# Patient Record
Sex: FEMALE | Race: BLACK OR AFRICAN AMERICAN | Employment: FULL TIME | URBAN - METROPOLITAN AREA
[De-identification: names, ages, dates, MRNs, and addresses within clinical notes are randomized per-mention and may not be internally consistent; named-entity substitution may affect disease eponyms.]

---

## 2023-06-27 ENCOUNTER — CLINICAL DOCUMENTATION (OUTPATIENT)
Age: 58
End: 2023-06-27

## 2023-09-29 PROBLEM — R26.2 DIFFICULTY IN WALKING: Status: ACTIVE | Noted: 2019-01-31

## 2023-09-29 PROBLEM — R11.11 VOMITING WITHOUT NAUSEA: Status: ACTIVE | Noted: 2022-11-29

## 2023-09-29 PROBLEM — M25.571 ACUTE RIGHT ANKLE PAIN: Status: ACTIVE | Noted: 2019-01-31

## 2023-09-29 PROBLEM — R74.8 ELEVATED ALKALINE PHOSPHATASE LEVEL: Status: ACTIVE | Noted: 2021-03-30

## 2023-09-29 PROBLEM — H04.123 INSUFFICIENCY OF TEAR FILM OF BOTH EYES: Status: ACTIVE | Noted: 2018-05-03

## 2023-09-29 PROBLEM — H25.813 COMBINED FORMS OF AGE-RELATED CATARACT OF BOTH EYES: Status: ACTIVE | Noted: 2018-05-03

## 2023-09-29 RX ORDER — ESOMEPRAZOLE MAGNESIUM 40 MG/1
40 CAPSULE, DELAYED RELEASE ORAL DAILY
COMMUNITY
Start: 2012-05-04

## 2023-09-29 RX ORDER — MONTELUKAST SODIUM 10 MG/1
10 TABLET ORAL DAILY
COMMUNITY
Start: 2017-03-02

## 2023-09-29 RX ORDER — IBUPROFEN 600 MG/1
600 TABLET ORAL EVERY 6 HOURS PRN
COMMUNITY
Start: 2012-08-02

## 2023-09-29 RX ORDER — FAMOTIDINE 40 MG/1
40 TABLET, FILM COATED ORAL NIGHTLY
Qty: 30 TABLET | Refills: 11 | COMMUNITY
Start: 2022-12-15 | End: 2023-12-15

## 2023-09-29 RX ORDER — SUMATRIPTAN 20 MG/1
1 SPRAY NASAL
COMMUNITY
Start: 2017-03-02

## 2023-09-29 RX ORDER — MELATONIN
1000 DAILY
COMMUNITY

## 2023-09-29 RX ORDER — ALBUTEROL SULFATE 90 UG/1
2 AEROSOL, METERED RESPIRATORY (INHALATION) PRN
COMMUNITY
Start: 2016-02-29

## 2023-09-29 RX ORDER — HYDROCODONE BITARTRATE AND ACETAMINOPHEN 7.5; 325 MG/1; MG/1
7.5 TABLET ORAL PRN
COMMUNITY
Start: 2017-04-10 | End: 2023-10-02

## 2023-09-29 RX ORDER — ALPRAZOLAM 0.25 MG/1
0.25 TABLET ORAL 3 TIMES DAILY PRN
COMMUNITY
Start: 2023-07-03 | End: 2023-10-02

## 2023-09-29 RX ORDER — LANOLIN ALCOHOL/MO/W.PET/CERES
325 CREAM (GRAM) TOPICAL DAILY
COMMUNITY
Start: 2012-05-04

## 2023-09-29 RX ORDER — FLUTICASONE PROPIONATE AND SALMETEROL 250; 50 UG/1; UG/1
1 POWDER RESPIRATORY (INHALATION) 2 TIMES DAILY
COMMUNITY
Start: 2023-02-28

## 2023-09-29 RX ORDER — HERBAL DRUGS
CAPSULE ORAL
COMMUNITY

## 2023-09-29 RX ORDER — FLUTICASONE PROPIONATE 50 MCG
SPRAY, SUSPENSION (ML) NASAL
COMMUNITY
Start: 2022-11-26

## 2023-09-29 RX ORDER — ORPHENADRINE CITRATE 100 MG/1
100 TABLET, EXTENDED RELEASE ORAL 2 TIMES DAILY
COMMUNITY
Start: 2023-04-16 | End: 2023-10-02

## 2023-10-02 ENCOUNTER — HOSPITAL ENCOUNTER (OUTPATIENT)
Facility: HOSPITAL | Age: 58
Setting detail: SPECIMEN
Discharge: HOME | End: 2023-10-05
Payer: COMMERCIAL

## 2023-10-02 ENCOUNTER — OFFICE VISIT (OUTPATIENT)
Age: 58
End: 2023-10-02
Payer: COMMERCIAL

## 2023-10-02 VITALS
OXYGEN SATURATION: 98 % | WEIGHT: 163 LBS | TEMPERATURE: 97.8 F | SYSTOLIC BLOOD PRESSURE: 137 MMHG | DIASTOLIC BLOOD PRESSURE: 91 MMHG | BODY MASS INDEX: 34.22 KG/M2 | HEIGHT: 58 IN | HEART RATE: 83 BPM

## 2023-10-02 DIAGNOSIS — R74.8 ELEVATED ALKALINE PHOSPHATASE LEVEL: Primary | ICD-10-CM

## 2023-10-02 DIAGNOSIS — R74.8 ELEVATED ALKALINE PHOSPHATASE LEVEL: ICD-10-CM

## 2023-10-02 LAB
ALBUMIN SERPL-MCNC: 3.8 G/DL (ref 3.4–5)
ALBUMIN/GLOB SERPL: 1.1 (ref 0.8–1.7)
ALP SERPL-CCNC: 182 U/L (ref 45–117)
ALT SERPL-CCNC: 37 U/L (ref 13–56)
ANION GAP SERPL CALC-SCNC: 4 MMOL/L (ref 3–18)
AST SERPL-CCNC: 24 U/L (ref 10–38)
BASOPHILS # BLD: 0 K/UL (ref 0–0.1)
BASOPHILS NFR BLD: 1 % (ref 0–2)
BILIRUB DIRECT SERPL-MCNC: <0.1 MG/DL (ref 0–0.2)
BILIRUB SERPL-MCNC: 0.4 MG/DL (ref 0.2–1)
BUN SERPL-MCNC: 17 MG/DL (ref 7–18)
BUN/CREAT SERPL: 19 (ref 12–20)
CALCIUM SERPL-MCNC: 9.2 MG/DL (ref 8.5–10.1)
CHLORIDE SERPL-SCNC: 109 MMOL/L (ref 100–111)
CO2 SERPL-SCNC: 27 MMOL/L (ref 21–32)
CREAT SERPL-MCNC: 0.88 MG/DL (ref 0.6–1.3)
DIFFERENTIAL METHOD BLD: ABNORMAL
EOSINOPHIL # BLD: 0.1 K/UL (ref 0–0.4)
EOSINOPHIL NFR BLD: 1 % (ref 0–5)
ERYTHROCYTE [DISTWIDTH] IN BLOOD BY AUTOMATED COUNT: 12 % (ref 11.6–14.5)
FERRITIN SERPL-MCNC: 386 NG/ML (ref 8–388)
GLOBULIN SER CALC-MCNC: 3.4 G/DL (ref 2–4)
GLUCOSE SERPL-MCNC: 100 MG/DL (ref 74–99)
HCT VFR BLD AUTO: 38.3 % (ref 35–45)
HGB BLD-MCNC: 12.2 G/DL (ref 12–16)
IMM GRANULOCYTES # BLD AUTO: 0.1 K/UL (ref 0–0.04)
IMM GRANULOCYTES NFR BLD AUTO: 1 % (ref 0–0.5)
IRON SATN MFR SERPL: 21 % (ref 20–50)
IRON SERPL-MCNC: 66 UG/DL (ref 50–175)
LYMPHOCYTES # BLD: 2.4 K/UL (ref 0.9–3.6)
LYMPHOCYTES NFR BLD: 37 % (ref 21–52)
MCH RBC QN AUTO: 29.9 PG (ref 24–34)
MCHC RBC AUTO-ENTMCNC: 31.9 G/DL (ref 31–37)
MCV RBC AUTO: 93.9 FL (ref 78–100)
MONOCYTES # BLD: 0.5 K/UL (ref 0.05–1.2)
MONOCYTES NFR BLD: 8 % (ref 3–10)
NEUTS SEG # BLD: 3.5 K/UL (ref 1.8–8)
NEUTS SEG NFR BLD: 53 % (ref 40–73)
NRBC # BLD: 0 K/UL (ref 0–0.01)
NRBC BLD-RTO: 0 PER 100 WBC
PLATELET # BLD AUTO: 233 K/UL (ref 135–420)
PMV BLD AUTO: 12.5 FL (ref 9.2–11.8)
POTASSIUM SERPL-SCNC: 4.1 MMOL/L (ref 3.5–5.5)
PROT SERPL-MCNC: 7.2 G/DL (ref 6.4–8.2)
RBC # BLD AUTO: 4.08 M/UL (ref 4.2–5.3)
SODIUM SERPL-SCNC: 140 MMOL/L (ref 136–145)
TIBC SERPL-MCNC: 316 UG/DL (ref 250–450)
WBC # BLD AUTO: 6.5 K/UL (ref 4.6–13.2)

## 2023-10-02 PROCEDURE — 82390 ASSAY OF CERULOPLASMIN: CPT

## 2023-10-02 PROCEDURE — 86038 ANTINUCLEAR ANTIBODIES: CPT

## 2023-10-02 PROCEDURE — 36415 COLL VENOUS BLD VENIPUNCTURE: CPT

## 2023-10-02 PROCEDURE — 82728 ASSAY OF FERRITIN: CPT

## 2023-10-02 PROCEDURE — 80048 BASIC METABOLIC PNL TOTAL CA: CPT

## 2023-10-02 PROCEDURE — 86037 ANCA TITER EACH ANTIBODY: CPT

## 2023-10-02 PROCEDURE — 83540 ASSAY OF IRON: CPT

## 2023-10-02 PROCEDURE — 80076 HEPATIC FUNCTION PANEL: CPT

## 2023-10-02 PROCEDURE — 82103 ALPHA-1-ANTITRYPSIN TOTAL: CPT

## 2023-10-02 PROCEDURE — 99203 OFFICE O/P NEW LOW 30 MIN: CPT | Performed by: INTERNAL MEDICINE

## 2023-10-02 PROCEDURE — 86015 ACTIN ANTIBODY EACH: CPT

## 2023-10-02 PROCEDURE — 86381 MITOCHONDRIAL ANTIBODY EACH: CPT

## 2023-10-02 PROCEDURE — 85025 COMPLETE CBC W/AUTO DIFF WBC: CPT

## 2023-10-02 PROCEDURE — 83550 IRON BINDING TEST: CPT

## 2023-10-02 ASSESSMENT — PATIENT HEALTH QUESTIONNAIRE - PHQ9
SUM OF ALL RESPONSES TO PHQ QUESTIONS 1-9: 0
2. FEELING DOWN, DEPRESSED OR HOPELESS: 0
SUM OF ALL RESPONSES TO PHQ9 QUESTIONS 1 & 2: 0
1. LITTLE INTEREST OR PLEASURE IN DOING THINGS: 0
SUM OF ALL RESPONSES TO PHQ QUESTIONS 1-9: 0

## 2023-10-02 NOTE — PROGRESS NOTES
[address] [place] [facility], [address] [id number] [facility],ws

## 2023-10-03 LAB
A1AT SERPL-MCNC: 116 MG/DL (ref 101–187)
ANA TITR SER IF: NEGATIVE
CERULOPLASMIN SERPL-MCNC: 27.8 MG/DL (ref 19–39)
LABORATORY COMMENT REPORT: NORMAL

## 2023-10-04 LAB
MITOCHONDRIA M2 IGG SER-ACNC: <20 UNITS (ref 0–20)
SMA IGG SER-ACNC: 13 UNITS (ref 0–19)

## 2023-10-05 LAB
C-ANCA TITR SER IF: NORMAL TITER
P-ANCA ATYPICAL TITR SER IF: NORMAL TITER
P-ANCA TITR SER IF: NORMAL TITER

## 2023-10-09 ENCOUNTER — HOSPITAL ENCOUNTER (OUTPATIENT)
Facility: HOSPITAL | Age: 58
Discharge: HOME | End: 2023-10-12
Payer: COMMERCIAL

## 2023-10-09 DIAGNOSIS — R74.8 ELEVATED ALKALINE PHOSPHATASE LEVEL: ICD-10-CM

## 2023-10-09 PROCEDURE — A9579 GAD-BASE MR CONTRAST NOS,1ML: HCPCS | Performed by: NURSE PRACTITIONER

## 2023-10-09 PROCEDURE — 6360000004 HC RX CONTRAST MEDICATION: Performed by: NURSE PRACTITIONER

## 2023-10-09 PROCEDURE — 74183 MRI ABD W/O CNTR FLWD CNTR: CPT

## 2023-10-09 RX ADMIN — GADOTERIDOL 15 ML: 279.3 INJECTION, SOLUTION INTRAVENOUS at 16:33

## 2023-10-22 PROBLEM — R11.11 VOMITING WITHOUT NAUSEA: Status: RESOLVED | Noted: 2022-11-29 | Resolved: 2023-10-22

## 2023-10-22 PROBLEM — M25.571 ACUTE RIGHT ANKLE PAIN: Status: RESOLVED | Noted: 2019-01-31 | Resolved: 2023-10-22

## 2023-10-22 PROBLEM — H26.9 CATARACTS, BILATERAL: Status: ACTIVE | Noted: 2018-05-03

## 2023-10-22 PROBLEM — R26.2 DIFFICULTY IN WALKING: Status: RESOLVED | Noted: 2019-01-31 | Resolved: 2023-10-22

## 2023-10-22 PROBLEM — H04.123 INSUFFICIENCY OF TEAR FILM OF BOTH EYES: Status: RESOLVED | Noted: 2018-05-03 | Resolved: 2023-10-22

## 2023-11-07 ENCOUNTER — HOSPITAL ENCOUNTER (OUTPATIENT)
Facility: HOSPITAL | Age: 58
Setting detail: SPECIMEN
Discharge: HOME | End: 2023-11-10
Payer: COMMERCIAL

## 2023-11-07 ENCOUNTER — OFFICE VISIT (OUTPATIENT)
Age: 58
End: 2023-11-07
Payer: COMMERCIAL

## 2023-11-07 VITALS
WEIGHT: 162 LBS | BODY MASS INDEX: 34 KG/M2 | DIASTOLIC BLOOD PRESSURE: 85 MMHG | HEART RATE: 90 BPM | SYSTOLIC BLOOD PRESSURE: 136 MMHG | OXYGEN SATURATION: 98 % | HEIGHT: 58 IN | TEMPERATURE: 97.5 F

## 2023-11-07 DIAGNOSIS — R60.9 EDEMA, UNSPECIFIED TYPE: ICD-10-CM

## 2023-11-07 DIAGNOSIS — R74.8 ELEVATED ALKALINE PHOSPHATASE LEVEL: Primary | ICD-10-CM

## 2023-11-07 DIAGNOSIS — R74.8 ELEVATED ALKALINE PHOSPHATASE LEVEL: ICD-10-CM

## 2023-11-07 LAB
ALBUMIN SERPL-MCNC: 3.6 G/DL (ref 3.4–5)
ALBUMIN/GLOB SERPL: 1 (ref 0.8–1.7)
ALP SERPL-CCNC: 165 U/L (ref 45–117)
ALT SERPL-CCNC: 31 U/L (ref 13–56)
ANION GAP SERPL CALC-SCNC: 6 MMOL/L (ref 3–18)
AST SERPL-CCNC: 18 U/L (ref 10–38)
BASOPHILS # BLD: 0 K/UL (ref 0–0.1)
BASOPHILS NFR BLD: 1 % (ref 0–2)
BILIRUB DIRECT SERPL-MCNC: <0.1 MG/DL (ref 0–0.2)
BILIRUB SERPL-MCNC: 0.3 MG/DL (ref 0.2–1)
BUN SERPL-MCNC: 14 MG/DL (ref 7–18)
BUN/CREAT SERPL: 20 (ref 12–20)
CALCIUM SERPL-MCNC: 8.9 MG/DL (ref 8.5–10.1)
CHLORIDE SERPL-SCNC: 110 MMOL/L (ref 100–111)
CO2 SERPL-SCNC: 27 MMOL/L (ref 21–32)
CREAT SERPL-MCNC: 0.7 MG/DL (ref 0.6–1.3)
DIFFERENTIAL METHOD BLD: ABNORMAL
EOSINOPHIL # BLD: 0.1 K/UL (ref 0–0.4)
EOSINOPHIL NFR BLD: 2 % (ref 0–5)
ERYTHROCYTE [DISTWIDTH] IN BLOOD BY AUTOMATED COUNT: 12.1 % (ref 11.6–14.5)
GLOBULIN SER CALC-MCNC: 3.5 G/DL (ref 2–4)
GLUCOSE SERPL-MCNC: 137 MG/DL (ref 74–99)
HCT VFR BLD AUTO: 38 % (ref 35–45)
HGB BLD-MCNC: 12 G/DL (ref 12–16)
IMM GRANULOCYTES # BLD AUTO: 0 K/UL (ref 0–0.04)
IMM GRANULOCYTES NFR BLD AUTO: 1 % (ref 0–0.5)
LYMPHOCYTES # BLD: 2.5 K/UL (ref 0.9–3.6)
LYMPHOCYTES NFR BLD: 42 % (ref 21–52)
MCH RBC QN AUTO: 29.6 PG (ref 24–34)
MCHC RBC AUTO-ENTMCNC: 31.6 G/DL (ref 31–37)
MCV RBC AUTO: 93.6 FL (ref 78–100)
MONOCYTES # BLD: 0.4 K/UL (ref 0.05–1.2)
MONOCYTES NFR BLD: 7 % (ref 3–10)
NEUTS SEG # BLD: 2.9 K/UL (ref 1.8–8)
NEUTS SEG NFR BLD: 49 % (ref 40–73)
NRBC # BLD: 0 K/UL (ref 0–0.01)
NRBC BLD-RTO: 0 PER 100 WBC
PLATELET # BLD AUTO: 228 K/UL (ref 135–420)
PMV BLD AUTO: 12.4 FL (ref 9.2–11.8)
POTASSIUM SERPL-SCNC: 3.9 MMOL/L (ref 3.5–5.5)
PROT SERPL-MCNC: 7.1 G/DL (ref 6.4–8.2)
RBC # BLD AUTO: 4.06 M/UL (ref 4.2–5.3)
SODIUM SERPL-SCNC: 143 MMOL/L (ref 136–145)
WBC # BLD AUTO: 6 K/UL (ref 4.6–13.2)

## 2023-11-07 PROCEDURE — 83883 ASSAY NEPHELOMETRY NOT SPEC: CPT

## 2023-11-07 PROCEDURE — 82465 ASSAY BLD/SERUM CHOLESTEROL: CPT

## 2023-11-07 PROCEDURE — 83010 ASSAY OF HAPTOGLOBIN QUANT: CPT

## 2023-11-07 PROCEDURE — 82164 ANGIOTENSIN I ENZYME TEST: CPT

## 2023-11-07 PROCEDURE — 84478 ASSAY OF TRIGLYCERIDES: CPT

## 2023-11-07 PROCEDURE — 99213 OFFICE O/P EST LOW 20 MIN: CPT | Performed by: NURSE PRACTITIONER

## 2023-11-07 PROCEDURE — 91200 LIVER ELASTOGRAPHY: CPT | Performed by: NURSE PRACTITIONER

## 2023-11-07 PROCEDURE — 80048 BASIC METABOLIC PNL TOTAL CA: CPT

## 2023-11-07 PROCEDURE — 82977 ASSAY OF GGT: CPT

## 2023-11-07 PROCEDURE — 85025 COMPLETE CBC W/AUTO DIFF WBC: CPT

## 2023-11-07 PROCEDURE — 84450 TRANSFERASE (AST) (SGOT): CPT

## 2023-11-07 PROCEDURE — 36415 COLL VENOUS BLD VENIPUNCTURE: CPT

## 2023-11-07 PROCEDURE — 84460 ALANINE AMINO (ALT) (SGPT): CPT

## 2023-11-07 PROCEDURE — 80076 HEPATIC FUNCTION PANEL: CPT

## 2023-11-07 PROCEDURE — 82172 ASSAY OF APOLIPOPROTEIN: CPT

## 2023-11-07 NOTE — PROGRESS NOTES
MD Graham, FACP, Elizabeth Barker, [place]      STEPH Cristobal, [facility]   Eddie Shaw, [facility]   Klever Roberto, FNKAYY-MOUNIKA Sarkar, FNP-C   Rebekah Chase, [facility]   Ananya Wallace, [facility]      [address]   at [facility]   [address] [facility], [address] [facility], [address] [facility]   [phone]   FAX: [address]   at [facility], [address] [facility], [address]   [phone]   FAX: [phone]     Patient Care Team:  Jennifer Calderon MD as PCP - General    Patient Active Problem List   Diagnosis    Chronic cough    Cataracts, bilateral    Elevated alkaline phosphatase level    Gastroesophageal reflux disease     Mona Ocampo is being seen at [address] [place] [address],[address] [facility] for management of   elevated alkaline phosphatase. The active problem list, all pertinent past medical history, medications, liver histology, endoscopic studies, radiologic findings   and laboratory findings related to the liver disorder were reviewed and discussed with the patient. This is the first appointment at [address] [place] [address],[address] since 11/2014. The patient is a 59 y.o. female who was found to have elevated alkaline phosphatase in 2014. Serologic evaluation for markers of chronic liver disease was negative. The most recent imaging of the liver was ultrasound performed in 5/2023. Results suggest steatotic liver disease (SLD). No liver mass lesions noted. Assessment of liver fibrosis with Fibroscan was performed in the office today. The result was 9.5 kPa which correlates with stage 3 fibrosis. The CAP score of 287 suggests hepatic steatosis. In the office today the patient has the following symptoms:  The patient feels well.     The patient is not experiencing the following

## 2023-11-09 LAB — ACE SERPL-CCNC: 73 U/L (ref 14–82)

## 2023-11-10 LAB
A2 MACROGLOB SERPL-MCNC: 256 MG/DL (ref 110–276)
ALT SERPL-CCNC: 26 IU/L (ref 0–40)
APO A-I SERPL-MCNC: 184 MG/DL (ref 116–209)
AST SERPL W P-5'-P-CCNC: 23 IU/L (ref 0–40)
BILIRUB SERPL-MCNC: 0.2 MG/DL (ref 0–1.2)
CHOLEST SERPL-MCNC: 212 MG/DL (ref 100–199)
FIBROSIS SCORING:: ABNORMAL
FIBROSIS STAGE SERPL QL: ABNORMAL
GGT SERPL-CCNC: 72 IU/L (ref 0–60)
GLUCOSE SERPL-MCNC: 138 MG/DL (ref 70–99)
HAPTOGLOB SERPL-MCNC: 108 MG/DL (ref 33–346)
INTERPRETATION: ABNORMAL
LABORATORY COMMENT REPORT: ABNORMAL
LIVER FIBR SCORE SERPL CALC.FIBROSURE: 0.19 (ref 0–0.21)
Lab: ABNORMAL
NASH - STEATOSIS GRADE: ABNORMAL
NASH - STEATOSIS GRADING: ABNORMAL
NASH - STEATOSIS SCORE: 0.55 (ref 0–0.4)
NASH SCORING: ABNORMAL
NECROINFLAMMATORY ACT GRADE SERPL QL: ABNORMAL
NECROINFLAMMATORY ACT SCORE SERPL: 0.37 (ref 0–0.25)
SERVICE CMNT-IMP: ABNORMAL
TRIGL SERPL-MCNC: 80 MG/DL (ref 0–149)

## 2024-02-19 ENCOUNTER — HOSPITAL ENCOUNTER (OUTPATIENT)
Facility: HOSPITAL | Age: 59
Setting detail: SPECIMEN
Discharge: HOME | End: 2024-02-22
Payer: COMMERCIAL

## 2024-02-19 ENCOUNTER — OFFICE VISIT (OUTPATIENT)
Age: 59
End: 2024-02-19
Payer: COMMERCIAL

## 2024-02-19 VITALS
SYSTOLIC BLOOD PRESSURE: 128 MMHG | TEMPERATURE: 97.3 F | BODY MASS INDEX: 32.95 KG/M2 | OXYGEN SATURATION: 98 % | DIASTOLIC BLOOD PRESSURE: 71 MMHG | HEART RATE: 74 BPM | WEIGHT: 157 LBS | HEIGHT: 58 IN

## 2024-02-19 DIAGNOSIS — R74.8 ELEVATED ALKALINE PHOSPHATASE LEVEL: ICD-10-CM

## 2024-02-19 DIAGNOSIS — R74.8 ELEVATED ALKALINE PHOSPHATASE LEVEL: Primary | ICD-10-CM

## 2024-02-19 LAB
ALBUMIN SERPL-MCNC: 3.8 G/DL (ref 3.4–5)
ALBUMIN/GLOB SERPL: 1.2 (ref 0.8–1.7)
ALP SERPL-CCNC: 127 U/L (ref 45–117)
ALT SERPL-CCNC: 26 U/L (ref 13–56)
AST SERPL-CCNC: 20 U/L (ref 10–38)
BILIRUB DIRECT SERPL-MCNC: 0.1 MG/DL (ref 0–0.2)
BILIRUB SERPL-MCNC: 0.4 MG/DL (ref 0.2–1)
GLOBULIN SER CALC-MCNC: 3.1 G/DL (ref 2–4)
PROT SERPL-MCNC: 6.9 G/DL (ref 6.4–8.2)

## 2024-02-19 PROCEDURE — 36415 COLL VENOUS BLD VENIPUNCTURE: CPT

## 2024-02-19 PROCEDURE — 80076 HEPATIC FUNCTION PANEL: CPT

## 2024-02-19 PROCEDURE — 99213 OFFICE O/P EST LOW 20 MIN: CPT | Performed by: INTERNAL MEDICINE

## 2024-02-19 RX ORDER — DEXTROMETHORPHAN HYDROBROMIDE AND PROMETHAZINE HYDROCHLORIDE 15; 6.25 MG/5ML; MG/5ML
SYRUP ORAL
COMMUNITY
Start: 2024-01-31

## 2024-02-19 RX ORDER — PREDNISONE 20 MG/1
TABLET ORAL
COMMUNITY
Start: 2024-01-19

## 2024-02-19 RX ORDER — OSELTAMIVIR PHOSPHATE 75 MG/1
CAPSULE ORAL
COMMUNITY
Start: 2024-01-31

## 2024-02-19 RX ORDER — MELOXICAM 7.5 MG/1
7.5 TABLET ORAL DAILY
COMMUNITY
Start: 2024-02-13

## 2024-02-19 NOTE — PROGRESS NOTES
grandson.    The patient has never used tobacco products.    The patient consumes alcohol on social occasions never in excess.    The patient currently works full time as administrative work for .        PHYSICAL EXAMINATION:  /71 (Site: Right Upper Arm, Position: Sitting, Cuff Size: Medium Adult)   Pulse 74   Temp 97.3 °F (36.3 °C)   Ht 1.473 m (4' 10\")   Wt 71.2 kg (157 lb)   SpO2 98%   BMI 32.81 kg/m²     General: No acute distress.   Eyes: Sclera anicteric.   ENT: No oral lesions.  Thyroid normal.  Nodes: No adenopathy.   Skin: No spider angiomata.  No jaundice.  No palmar erythema.  Respiratory: Lungs clear to auscultation.   Cardiovascular: Regular heart rate.  No murmurs.  No JVD.  Abdomen: Soft non-tender.  Liver size normal to percussion/palpation.  Spleen not palpable. No obvious ascites.  Extremities: No edema.  No muscle wasting.  No gross arthritic changes.  Neurologic: Alert and oriented.  Cranial nerves grossly intact.  No asterixis.    LABORATORY STUDIES:   Latest Ref Rng 10/2/2023 11/7/2023 2/19/2024   [facility] - Routine Labs       WBC 4.6 - 13.2 K/uL 6.5  6.0     ANC 1.8 - 8.0 K/UL 3.5  2.9     HGB 12.0 - 16.0 g/dL 12.2  12.0      - 420 K/uL 233  228     AST 10 - 38 U/L 24  18  20    ALT 13 - 56 U/L 37  31  26    ALT   26     Alk Phos 45 - 117 U/L 182 (H)  165 (H)  127 (H)    Bili, Total 0.2 - 1.0 MG/DL 0.4  0.3  0.4    Bili, Total   0.2     Bili, Direct 0.0 - 0.2 MG/DL <0.1  <0.1  0.1    Albumin 3.4 - 5.0 g/dL 3.8  3.6  3.8    BUN 7.0 - 18 MG/DL 17  14     Creat 0.6 - 1.3 MG/DL 0.88  0.70     Na 136 - 145 mmol/L 140  143     K 3.5 - 5.5 mmol/L 4.1  3.9     Cl 100 - 111 mmol/L 109  110     CO2 21 - 32 mmol/L 27  27     Glucose 74 - 99 mg/dL 100 (H)  137 (H)     Glucose 70 - 99 mg/dL  138 (H)         SEROLOGIES:  1/2013.  HAV IgM Ab negative,HBsAntigen negative, anti-HBcore IgM negative, anti-HCV negative     Latest Ref Rng 2/20/2013 11/14/2014 10/2/2023   [facility] -

## 2024-08-20 ENCOUNTER — OFFICE VISIT (OUTPATIENT)
Age: 59
End: 2024-08-20
Payer: COMMERCIAL

## 2024-08-20 VITALS
HEIGHT: 58 IN | SYSTOLIC BLOOD PRESSURE: 145 MMHG | HEART RATE: 80 BPM | DIASTOLIC BLOOD PRESSURE: 98 MMHG | OXYGEN SATURATION: 99 % | TEMPERATURE: 98.6 F | BODY MASS INDEX: 32.28 KG/M2 | WEIGHT: 153.8 LBS

## 2024-08-20 DIAGNOSIS — R74.8 ELEVATED ALKALINE PHOSPHATASE LEVEL: Primary | ICD-10-CM

## 2024-08-20 PROCEDURE — 99213 OFFICE O/P EST LOW 20 MIN: CPT | Performed by: NURSE PRACTITIONER

## 2024-08-20 RX ORDER — BISACODYL 5 MG/1
TABLET, DELAYED RELEASE ORAL
COMMUNITY
Start: 2024-03-07 | End: 2024-08-20

## 2024-08-20 RX ORDER — ONDANSETRON 4 MG/1
4 TABLET, FILM COATED ORAL EVERY 8 HOURS PRN
COMMUNITY
Start: 2024-07-30

## 2024-08-20 RX ORDER — POLYETHYLENE GLYCOL 3350 17 G/17G
POWDER, FOR SOLUTION ORAL
COMMUNITY
Start: 2024-03-07 | End: 2024-08-20

## 2024-08-20 RX ORDER — MECLIZINE HYDROCHLORIDE 25 MG/1
25 TABLET ORAL EVERY 8 HOURS PRN
COMMUNITY
Start: 2024-07-30

## 2024-08-20 NOTE — PROGRESS NOTES
[facility]      Shola Conner MD, FACP, FACG, FAASLD      THEODORE Metcalf-MOUNIKA Lai, [facility]   Gabbi Salcedogato, [facility]   Lucinda Jeff, [facility]  Wes Mckee, [facility]   Valerie Mims, [facility]   Shaniqua Aston, [facility]   [address], [address]   [place]  [address] [phone]   FAX: [phone]  [facility]   [address], [address]   [place]  [address] [phone]   FAX: [phone]     Patient Care Team:  Royal Richardson MD as PCP - Cordell Pink MD (Cardiology)    Patient Active Problem List   Diagnosis    Chronic cough    Cataracts, bilateral    Elevated alkaline phosphatase level    Gastroesophageal reflux disease     Елена Shen is being seen at [facility] for management of elevated alkaline phosphatase.      The active problem list, all pertinent past medical history, medications, liver histology, endoscopic studies, radiologic findings and laboratory findings related to the liver disorder were reviewed and discussed with the patient.      The patient is a 58 y.o. female who was found to have elevated alkaline phosphatase in 2014.      Serologic evaluation for markers of chronic liver disease was negative.    The most recent imaging of the liver was ultrasound performed in 5/2023.  Results suggest steatotic liver disease (SLD).  No liver mass lesions noted.    Fibroscan performed in 11/2023 was 9.5 kPa which correlates with stage 3 fibrosis.  The CAP score of 287 suggests hepatic steatosis.     Since the last office appointment:   The patient has been trying to cut carbs and has lost 4 pounds since 2/2024.  The patient was also seen recently in the ED and subsequently diagnosed with vertigo by her PCP.    In the office

## 2025-01-15 ENCOUNTER — OFFICE VISIT (OUTPATIENT)
Age: 60
End: 2025-01-15

## 2025-01-15 VITALS
DIASTOLIC BLOOD PRESSURE: 83 MMHG | HEART RATE: 71 BPM | TEMPERATURE: 97 F | HEIGHT: 58 IN | BODY MASS INDEX: 34.51 KG/M2 | WEIGHT: 164.4 LBS | SYSTOLIC BLOOD PRESSURE: 140 MMHG

## 2025-01-15 DIAGNOSIS — K76.0 HEPATIC STEATOSIS: Primary | ICD-10-CM

## 2025-01-15 NOTE — PROGRESS NOTES
was counseled regarding diet and exercise to achieve weight loss.    The best diet for patients with fatty liver is one very low in carbohydrates and enriched with protein such as an [employer] program.    The patient should avoid food products and drinks containing fructose as this enhances hepatic fat synthesis.    Other treatment or surgery which can be utilized for weight loss include:  Treatment with a GLP1 agonist class of diabetic agents   Gastric bypass or sleeve gastrectomy    Screening for Hepatocellular Carcinoma  HCC screening is not necessary if the patient has no evidence of cirrhosis.    Treatment of other medical problems in patients with chronic liver disease  There are no contraindications for the patient to take most medications that are necessary for treatment of other medical issues.    The patient can take any medications utilized for treatment of DM, statins to treat hypercholesterolemia.    Normal doses of acetaminophen, as recommended on the label of the bottle, are not hepatotoxic except in the setting of daily alcohol use.    Acetaminophen can be utilized to treat pain in patiens with chronic liver disease with or without cirrhosis.    Counseling for alcohol in patients with chronic liver disease  The patient was counseled regarding alcohol consumption and the effect of alcohol on chronic liver disease.    The patient does not consume any significant amount of alcohol.    The patient was reminded that alcohol can cause fatty liver.    Vaccinations   The need for vaccination against viral hepatitis A and B will be assessed with serologic and instituted as appropriate.    Fully vaccinated for COVID-19    Routine vaccinations against other bacterial and viral agents can be performed as indicated.  Annual flu vaccination should be administered if indicated.    ALLERGIES  Allergies   Allergen Reactions    Hydrocodone-Acetaminophen Other (See Comments)     Has nightmares  Nightmares  Nightmares

## 2025-01-26 PROBLEM — K76.0 METABOLIC DYSFUNCTION-ASSOCIATED STEATOTIC LIVER DISEASE (MASLD): Status: ACTIVE | Noted: 2025-01-26
